# Patient Record
Sex: MALE | Race: OTHER | HISPANIC OR LATINO | URBAN - METROPOLITAN AREA
[De-identification: names, ages, dates, MRNs, and addresses within clinical notes are randomized per-mention and may not be internally consistent; named-entity substitution may affect disease eponyms.]

---

## 2020-01-01 ENCOUNTER — OUTPATIENT (OUTPATIENT)
Dept: OUTPATIENT SERVICES | Facility: HOSPITAL | Age: 75
LOS: 1 days | End: 2020-01-01

## 2020-01-01 ENCOUNTER — INPATIENT (INPATIENT)
Facility: HOSPITAL | Age: 75
LOS: 0 days | DRG: 177 | End: 2020-04-19
Attending: INTERNAL MEDICINE | Admitting: INTERNAL MEDICINE
Payer: MEDICARE

## 2020-01-01 VITALS
SYSTOLIC BLOOD PRESSURE: 90 MMHG | RESPIRATION RATE: 19 BRPM | OXYGEN SATURATION: 59 % | TEMPERATURE: 100 F | DIASTOLIC BLOOD PRESSURE: 54 MMHG | HEART RATE: 82 BPM

## 2020-01-01 VITALS
OXYGEN SATURATION: 89 % | DIASTOLIC BLOOD PRESSURE: 62 MMHG | TEMPERATURE: 98 F | RESPIRATION RATE: 32 BRPM | SYSTOLIC BLOOD PRESSURE: 104 MMHG | HEART RATE: 102 BPM

## 2020-01-01 DIAGNOSIS — J96.01 ACUTE RESPIRATORY FAILURE WITH HYPOXIA: ICD-10-CM

## 2020-01-01 DIAGNOSIS — I48.91 UNSPECIFIED ATRIAL FIBRILLATION: ICD-10-CM

## 2020-01-01 DIAGNOSIS — Z29.9 ENCOUNTER FOR PROPHYLACTIC MEASURES, UNSPECIFIED: ICD-10-CM

## 2020-01-01 DIAGNOSIS — I10 ESSENTIAL (PRIMARY) HYPERTENSION: ICD-10-CM

## 2020-01-01 DIAGNOSIS — N17.9 ACUTE KIDNEY FAILURE, UNSPECIFIED: ICD-10-CM

## 2020-01-01 DIAGNOSIS — U07.1 COVID-19: ICD-10-CM

## 2020-01-01 LAB
A1C WITH ESTIMATED AVERAGE GLUCOSE RESULT: 5.5 % — SIGNIFICANT CHANGE UP (ref 4–5.6)
ALBUMIN SERPL ELPH-MCNC: 2.5 G/DL — LOW (ref 3.5–5)
ALBUMIN SERPL ELPH-MCNC: 2.6 G/DL — LOW (ref 3.5–5)
ALP SERPL-CCNC: 100 U/L — SIGNIFICANT CHANGE UP (ref 40–120)
ALP SERPL-CCNC: 87 U/L — SIGNIFICANT CHANGE UP (ref 40–120)
ALT FLD-CCNC: 20 U/L DA — SIGNIFICANT CHANGE UP (ref 10–60)
ALT FLD-CCNC: 21 U/L DA — SIGNIFICANT CHANGE UP (ref 10–60)
ANION GAP SERPL CALC-SCNC: 8 MMOL/L — SIGNIFICANT CHANGE UP (ref 5–17)
ANION GAP SERPL CALC-SCNC: 8 MMOL/L — SIGNIFICANT CHANGE UP (ref 5–17)
APPEARANCE UR: CLEAR — SIGNIFICANT CHANGE UP
APTT BLD: 62.5 SEC — HIGH (ref 27.5–36.3)
AST SERPL-CCNC: 25 U/L — SIGNIFICANT CHANGE UP (ref 10–40)
AST SERPL-CCNC: 42 U/L — HIGH (ref 10–40)
BASOPHILS # BLD AUTO: 0.03 K/UL — SIGNIFICANT CHANGE UP (ref 0–0.2)
BASOPHILS # BLD AUTO: 0.03 K/UL — SIGNIFICANT CHANGE UP (ref 0–0.2)
BASOPHILS NFR BLD AUTO: 0.2 % — SIGNIFICANT CHANGE UP (ref 0–2)
BASOPHILS NFR BLD AUTO: 0.2 % — SIGNIFICANT CHANGE UP (ref 0–2)
BILIRUB SERPL-MCNC: 0.8 MG/DL — SIGNIFICANT CHANGE UP (ref 0.2–1.2)
BILIRUB SERPL-MCNC: 0.9 MG/DL — SIGNIFICANT CHANGE UP (ref 0.2–1.2)
BILIRUB UR-MCNC: NEGATIVE — SIGNIFICANT CHANGE UP
BUN SERPL-MCNC: 36 MG/DL — HIGH (ref 7–18)
BUN SERPL-MCNC: 46 MG/DL — HIGH (ref 7–18)
CALCIUM SERPL-MCNC: 8.4 MG/DL — SIGNIFICANT CHANGE UP (ref 8.4–10.5)
CALCIUM SERPL-MCNC: 8.8 MG/DL — SIGNIFICANT CHANGE UP (ref 8.4–10.5)
CHLORIDE SERPL-SCNC: 109 MMOL/L — HIGH (ref 96–108)
CHLORIDE SERPL-SCNC: 112 MMOL/L — HIGH (ref 96–108)
CHOLEST SERPL-MCNC: 125 MG/DL — SIGNIFICANT CHANGE UP (ref 10–199)
CK SERPL-CCNC: 186 U/L — SIGNIFICANT CHANGE UP (ref 35–232)
CO2 SERPL-SCNC: 23 MMOL/L — SIGNIFICANT CHANGE UP (ref 22–31)
CO2 SERPL-SCNC: 24 MMOL/L — SIGNIFICANT CHANGE UP (ref 22–31)
COLOR SPEC: YELLOW — SIGNIFICANT CHANGE UP
CREAT ?TM UR-MCNC: 228 MG/DL — SIGNIFICANT CHANGE UP
CREAT SERPL-MCNC: 1.39 MG/DL — HIGH (ref 0.5–1.3)
CREAT SERPL-MCNC: 2.07 MG/DL — HIGH (ref 0.5–1.3)
CRP SERPL-MCNC: 16.41 MG/DL — HIGH (ref 0–0.4)
D DIMER BLD IA.RAPID-MCNC: 7442 NG/ML DDU — HIGH
DIFF PNL FLD: ABNORMAL
EOSINOPHIL # BLD AUTO: 0 K/UL — SIGNIFICANT CHANGE UP (ref 0–0.5)
EOSINOPHIL # BLD AUTO: 0.02 K/UL — SIGNIFICANT CHANGE UP (ref 0–0.5)
EOSINOPHIL NFR BLD AUTO: 0 % — SIGNIFICANT CHANGE UP (ref 0–6)
EOSINOPHIL NFR BLD AUTO: 0.1 % — SIGNIFICANT CHANGE UP (ref 0–6)
ESTIMATED AVERAGE GLUCOSE: 111 MG/DL — SIGNIFICANT CHANGE UP (ref 68–114)
FERRITIN SERPL-MCNC: 822 NG/ML — HIGH (ref 30–400)
FIBRINOGEN PPP-MCNC: 882 MG/DL — HIGH (ref 350–510)
GLUCOSE SERPL-MCNC: 163 MG/DL — HIGH (ref 70–99)
GLUCOSE SERPL-MCNC: 208 MG/DL — HIGH (ref 70–99)
GLUCOSE UR QL: NEGATIVE — SIGNIFICANT CHANGE UP
HCT VFR BLD CALC: 29.8 % — LOW (ref 39–50)
HCT VFR BLD CALC: 32.1 % — LOW (ref 39–50)
HDLC SERPL-MCNC: 33 MG/DL — LOW
HGB BLD-MCNC: 10.5 G/DL — LOW (ref 13–17)
HGB BLD-MCNC: 9.8 G/DL — LOW (ref 13–17)
IMM GRANULOCYTES NFR BLD AUTO: 0.8 % — SIGNIFICANT CHANGE UP (ref 0–1.5)
IMM GRANULOCYTES NFR BLD AUTO: 1 % — SIGNIFICANT CHANGE UP (ref 0–1.5)
INR BLD: 2.21 RATIO — HIGH (ref 0.88–1.16)
KETONES UR-MCNC: NEGATIVE — SIGNIFICANT CHANGE UP
LACTATE SERPL-SCNC: 2.1 MMOL/L — HIGH (ref 0.7–2)
LDH SERPL L TO P-CCNC: 549 U/L — HIGH (ref 120–225)
LEUKOCYTE ESTERASE UR-ACNC: NEGATIVE — SIGNIFICANT CHANGE UP
LIPID PNL WITH DIRECT LDL SERPL: 63 MG/DL — SIGNIFICANT CHANGE UP
LYMPHOCYTES # BLD AUTO: 0.71 K/UL — LOW (ref 1–3.3)
LYMPHOCYTES # BLD AUTO: 0.73 K/UL — LOW (ref 1–3.3)
LYMPHOCYTES # BLD AUTO: 4.3 % — LOW (ref 13–44)
LYMPHOCYTES # BLD AUTO: 5.4 % — LOW (ref 13–44)
MAGNESIUM SERPL-MCNC: 2.4 MG/DL — SIGNIFICANT CHANGE UP (ref 1.6–2.6)
MCHC RBC-ENTMCNC: 28.3 PG — SIGNIFICANT CHANGE UP (ref 27–34)
MCHC RBC-ENTMCNC: 28.9 PG — SIGNIFICANT CHANGE UP (ref 27–34)
MCHC RBC-ENTMCNC: 32.7 GM/DL — SIGNIFICANT CHANGE UP (ref 32–36)
MCHC RBC-ENTMCNC: 32.9 GM/DL — SIGNIFICANT CHANGE UP (ref 32–36)
MCV RBC AUTO: 86.1 FL — SIGNIFICANT CHANGE UP (ref 80–100)
MCV RBC AUTO: 88.4 FL — SIGNIFICANT CHANGE UP (ref 80–100)
MONOCYTES # BLD AUTO: 0.55 K/UL — SIGNIFICANT CHANGE UP (ref 0–0.9)
MONOCYTES # BLD AUTO: 0.99 K/UL — HIGH (ref 0–0.9)
MONOCYTES NFR BLD AUTO: 3.3 % — SIGNIFICANT CHANGE UP (ref 2–14)
MONOCYTES NFR BLD AUTO: 7.3 % — SIGNIFICANT CHANGE UP (ref 2–14)
NEUTROPHILS # BLD AUTO: 11.66 K/UL — HIGH (ref 1.8–7.4)
NEUTROPHILS # BLD AUTO: 15.07 K/UL — HIGH (ref 1.8–7.4)
NEUTROPHILS NFR BLD AUTO: 86.2 % — HIGH (ref 43–77)
NEUTROPHILS NFR BLD AUTO: 91.2 % — HIGH (ref 43–77)
NITRITE UR-MCNC: NEGATIVE — SIGNIFICANT CHANGE UP
NRBC # BLD: 0 /100 WBCS — SIGNIFICANT CHANGE UP (ref 0–0)
NRBC # BLD: 0 /100 WBCS — SIGNIFICANT CHANGE UP (ref 0–0)
NT-PROBNP SERPL-SCNC: 5524 PG/ML — HIGH (ref 0–450)
PH UR: 5 — SIGNIFICANT CHANGE UP (ref 5–8)
PHOSPHATE SERPL-MCNC: 2.4 MG/DL — LOW (ref 2.5–4.5)
PLATELET # BLD AUTO: 372 K/UL — SIGNIFICANT CHANGE UP (ref 150–400)
PLATELET # BLD AUTO: 439 K/UL — HIGH (ref 150–400)
POTASSIUM SERPL-MCNC: 3.9 MMOL/L — SIGNIFICANT CHANGE UP (ref 3.5–5.3)
POTASSIUM SERPL-MCNC: 4.2 MMOL/L — SIGNIFICANT CHANGE UP (ref 3.5–5.3)
POTASSIUM SERPL-SCNC: 3.9 MMOL/L — SIGNIFICANT CHANGE UP (ref 3.5–5.3)
POTASSIUM SERPL-SCNC: 4.2 MMOL/L — SIGNIFICANT CHANGE UP (ref 3.5–5.3)
PROT SERPL-MCNC: 6.8 G/DL — SIGNIFICANT CHANGE UP (ref 6–8.3)
PROT SERPL-MCNC: 7.1 G/DL — SIGNIFICANT CHANGE UP (ref 6–8.3)
PROT UR-MCNC: 30 MG/DL
PROTHROM AB SERPL-ACNC: 25.6 SEC — HIGH (ref 10–12.9)
RBC # BLD: 3.46 M/UL — LOW (ref 4.2–5.8)
RBC # BLD: 3.63 M/UL — LOW (ref 4.2–5.8)
RBC # FLD: 12 % — SIGNIFICANT CHANGE UP (ref 10.3–14.5)
RBC # FLD: 12.2 % — SIGNIFICANT CHANGE UP (ref 10.3–14.5)
SARS-COV-2 RNA SPEC QL NAA+PROBE: DETECTED
SODIUM SERPL-SCNC: 140 MMOL/L — SIGNIFICANT CHANGE UP (ref 135–145)
SODIUM SERPL-SCNC: 144 MMOL/L — SIGNIFICANT CHANGE UP (ref 135–145)
SODIUM UR-SCNC: 19 MMOL/L — SIGNIFICANT CHANGE UP
SP GR SPEC: 1.02 — SIGNIFICANT CHANGE UP (ref 1.01–1.02)
TOTAL CHOLESTEROL/HDL RATIO MEASUREMENT: 3.8 RATIO — SIGNIFICANT CHANGE UP (ref 3.4–9.6)
TRIGL SERPL-MCNC: 144 MG/DL — SIGNIFICANT CHANGE UP (ref 10–149)
TROPONIN I SERPL-MCNC: 0.02 NG/ML — SIGNIFICANT CHANGE UP (ref 0–0.04)
TSH SERPL-MCNC: 0.4 UU/ML — SIGNIFICANT CHANGE UP (ref 0.34–4.82)
UROBILINOGEN FLD QL: NEGATIVE — SIGNIFICANT CHANGE UP
VIT B12 SERPL-MCNC: 360 PG/ML — SIGNIFICANT CHANGE UP (ref 232–1245)
WBC # BLD: 13.54 K/UL — HIGH (ref 3.8–10.5)
WBC # BLD: 16.53 K/UL — HIGH (ref 3.8–10.5)
WBC # FLD AUTO: 13.54 K/UL — HIGH (ref 3.8–10.5)
WBC # FLD AUTO: 16.53 K/UL — HIGH (ref 3.8–10.5)

## 2020-01-01 PROCEDURE — 85027 COMPLETE CBC AUTOMATED: CPT

## 2020-01-01 PROCEDURE — 82607 VITAMIN B-12: CPT

## 2020-01-01 PROCEDURE — 93005 ELECTROCARDIOGRAM TRACING: CPT

## 2020-01-01 PROCEDURE — 84300 ASSAY OF URINE SODIUM: CPT

## 2020-01-01 PROCEDURE — 87635 SARS-COV-2 COVID-19 AMP PRB: CPT

## 2020-01-01 PROCEDURE — 85610 PROTHROMBIN TIME: CPT

## 2020-01-01 PROCEDURE — 71045 X-RAY EXAM CHEST 1 VIEW: CPT | Mod: 26

## 2020-01-01 PROCEDURE — 83880 ASSAY OF NATRIURETIC PEPTIDE: CPT

## 2020-01-01 PROCEDURE — 81001 URINALYSIS AUTO W/SCOPE: CPT

## 2020-01-01 PROCEDURE — 84100 ASSAY OF PHOSPHORUS: CPT

## 2020-01-01 PROCEDURE — 82570 ASSAY OF URINE CREATININE: CPT

## 2020-01-01 PROCEDURE — 82550 ASSAY OF CK (CPK): CPT

## 2020-01-01 PROCEDURE — 84443 ASSAY THYROID STIM HORMONE: CPT

## 2020-01-01 PROCEDURE — 85384 FIBRINOGEN ACTIVITY: CPT

## 2020-01-01 PROCEDURE — 99291 CRITICAL CARE FIRST HOUR: CPT | Mod: 25

## 2020-01-01 PROCEDURE — 83735 ASSAY OF MAGNESIUM: CPT

## 2020-01-01 PROCEDURE — 83605 ASSAY OF LACTIC ACID: CPT

## 2020-01-01 PROCEDURE — 36415 COLL VENOUS BLD VENIPUNCTURE: CPT

## 2020-01-01 PROCEDURE — 80061 LIPID PANEL: CPT

## 2020-01-01 PROCEDURE — 83036 HEMOGLOBIN GLYCOSYLATED A1C: CPT

## 2020-01-01 PROCEDURE — 82962 GLUCOSE BLOOD TEST: CPT

## 2020-01-01 PROCEDURE — 85730 THROMBOPLASTIN TIME PARTIAL: CPT

## 2020-01-01 PROCEDURE — 71045 X-RAY EXAM CHEST 1 VIEW: CPT

## 2020-01-01 PROCEDURE — 86140 C-REACTIVE PROTEIN: CPT

## 2020-01-01 PROCEDURE — 82728 ASSAY OF FERRITIN: CPT

## 2020-01-01 PROCEDURE — 99291 CRITICAL CARE FIRST HOUR: CPT

## 2020-01-01 PROCEDURE — 80053 COMPREHEN METABOLIC PANEL: CPT

## 2020-01-01 PROCEDURE — 85379 FIBRIN DEGRADATION QUANT: CPT

## 2020-01-01 PROCEDURE — 83615 LACTATE (LD) (LDH) ENZYME: CPT

## 2020-01-01 PROCEDURE — 84484 ASSAY OF TROPONIN QUANT: CPT

## 2020-01-01 RX ORDER — SODIUM CHLORIDE 9 MG/ML
1000 INJECTION INTRAMUSCULAR; INTRAVENOUS; SUBCUTANEOUS
Refills: 0 | Status: DISCONTINUED | OUTPATIENT
Start: 2020-01-01 | End: 2020-01-01

## 2020-01-01 RX ORDER — LABETALOL HCL 100 MG
300 TABLET ORAL
Refills: 0 | Status: DISCONTINUED | OUTPATIENT
Start: 2020-01-01 | End: 2020-01-01

## 2020-01-01 RX ORDER — NIFEDIPINE 30 MG
90 TABLET, EXTENDED RELEASE 24 HR ORAL DAILY
Refills: 0 | Status: DISCONTINUED | OUTPATIENT
Start: 2020-01-01 | End: 2020-01-01

## 2020-01-01 RX ORDER — TOCILIZUMAB 20 MG/ML
400 INJECTION, SOLUTION, CONCENTRATE INTRAVENOUS ONCE
Refills: 0 | Status: COMPLETED | OUTPATIENT
Start: 2020-01-01 | End: 2020-01-01

## 2020-01-01 RX ORDER — RIVAROXABAN 15 MG-20MG
15 KIT ORAL
Refills: 0 | Status: DISCONTINUED | OUTPATIENT
Start: 2020-01-01 | End: 2020-01-01

## 2020-01-01 RX ORDER — MORPHINE SULFATE 50 MG/1
1 CAPSULE, EXTENDED RELEASE ORAL DAILY
Refills: 0 | Status: DISCONTINUED | OUTPATIENT
Start: 2020-01-01 | End: 2020-01-01

## 2020-01-01 RX ORDER — METOPROLOL TARTRATE 50 MG
2.5 TABLET ORAL ONCE
Refills: 0 | Status: COMPLETED | OUTPATIENT
Start: 2020-01-01 | End: 2020-01-01

## 2020-01-01 RX ORDER — SODIUM CHLORIDE 9 MG/ML
1000 INJECTION, SOLUTION INTRAVENOUS
Refills: 0 | Status: DISCONTINUED | OUTPATIENT
Start: 2020-01-01 | End: 2020-01-01

## 2020-01-01 RX ORDER — PANTOPRAZOLE SODIUM 20 MG/1
40 TABLET, DELAYED RELEASE ORAL DAILY
Refills: 0 | Status: DISCONTINUED | OUTPATIENT
Start: 2020-01-01 | End: 2020-01-01

## 2020-01-01 RX ORDER — ATORVASTATIN CALCIUM 80 MG/1
40 TABLET, FILM COATED ORAL AT BEDTIME
Refills: 0 | Status: DISCONTINUED | OUTPATIENT
Start: 2020-01-01 | End: 2020-01-01

## 2020-01-01 RX ADMIN — TOCILIZUMAB 100 MILLIGRAM(S): 20 INJECTION, SOLUTION, CONCENTRATE INTRAVENOUS at 22:22

## 2020-01-01 RX ADMIN — Medication 2.5 MILLIGRAM(S): at 00:50

## 2020-01-01 RX ADMIN — Medication 0.25 MILLIGRAM(S): at 00:51

## 2020-01-01 RX ADMIN — SODIUM CHLORIDE 75 MILLILITER(S): 9 INJECTION INTRAMUSCULAR; INTRAVENOUS; SUBCUTANEOUS at 17:26

## 2020-01-01 RX ADMIN — Medication 90 MILLIGRAM(S): at 06:18

## 2020-01-01 RX ADMIN — Medication 40 MILLIGRAM(S): at 17:25

## 2020-01-01 RX ADMIN — PANTOPRAZOLE SODIUM 40 MILLIGRAM(S): 20 TABLET, DELAYED RELEASE ORAL at 13:10

## 2020-01-01 RX ADMIN — RIVAROXABAN 15 MILLIGRAM(S): KIT at 17:26

## 2020-01-01 RX ADMIN — ATORVASTATIN CALCIUM 40 MILLIGRAM(S): 80 TABLET, FILM COATED ORAL at 22:22

## 2020-01-01 RX ADMIN — Medication 300 MILLIGRAM(S): at 06:18

## 2020-01-01 RX ADMIN — Medication 40 MILLIGRAM(S): at 06:18

## 2020-01-01 RX ADMIN — SODIUM CHLORIDE 75 MILLILITER(S): 9 INJECTION, SOLUTION INTRAVENOUS at 00:50

## 2020-04-18 NOTE — ED PROVIDER NOTE - OBJECTIVE STATEMENT
75 male cad / ppm / sp ablation / xarelto / L shoulder pain for a few days. pt poor historian and sob - uto hx from him.

## 2020-04-18 NOTE — ED ADULT TRIAGE NOTE - CHIEF COMPLAINT QUOTE
asn per ems report pt has been running fever for the few days and became short of breath for the past 2-3 hours , pt is on z pack since 04/14

## 2020-04-18 NOTE — H&P ADULT - ASSESSMENT
75M from home, lives with wife and kids, PMH of Emilie on xarelto, s/p pacemaker, HTN presented to ED c/o shortness of breath. He states he has been having body aches, loss of appetite, mild dry cough for 1 week and yesterday night, he couldn't sleep due to shortness of breath. Denies sick contacts, vomiting, diarrhea, chest pain, palpitations, travel.    Admitted for pneumonia, related to COVID-19.   GOC: FULL CODE

## 2020-04-18 NOTE — H&P ADULT - HISTORY OF PRESENT ILLNESS
75M from home, lives with wife and kids, PMH of Emilie on xarelto, s/p pacemaker, HTN presented to ED c/o shortness of breath. He states he has been having body aches, loss of appetite, mild dry cough for 1 week and yesterday night, he couldn't sleep due to shortness of breath. Denies sick contacts, vomiting, diarrhea, chest pain, palpitations, travel.

## 2020-04-18 NOTE — ED PROVIDER NOTE - CRITICAL CARE PROVIDED
interpretation of diagnostic studies/direct patient care (not related to procedure)/documentation/additional history taking/consult w/ pt's family directly relating to pts condition

## 2020-04-18 NOTE — ED PROVIDER NOTE - PROGRESS NOTE DETAILS
dw pt daughter 2 wks ago had bran, on abx / then worse  / has been sleeping / poor po / productive cough / on xarelto / hallucinating and sob yest. full code. o2 was 86 on nrb, now high 90s when prone. xr read by me. R lung > L lung opacification. may be related to pt positioning (pt semi prone on R). discussed the case with the admitting MD who accepts the patient for admission. will not call icu as pt currently stable o2 but high risk for worsening over next few days.

## 2020-04-18 NOTE — ED ADULT NURSE NOTE - ED STAT RN HANDOFF DETAILS
Patient admitted to medicine in no acute distress placed in prone position to facilitate oxygenation. Patient endorsed to hold RN Marcia to be transported with oxygen stable in no acute distress safety maintained.

## 2020-04-18 NOTE — ED ADULT NURSE NOTE - OBJECTIVE STATEMENT
Patient BIBA with c/o SOB, fatigue , poor appetite, fever, and generalized body aches as per family . Patient was seen in Urgent Care 2 weeks ago provided antibiotics and went home continues to feel sick went back to Urgent Care with wife on monday to be tested for Covid awaiting results monday. Patient presented with oxygensaturation Patient BIBA with c/o SOB, fatigue , poor appetite, fever, and generalized body aches as per family . Patient was seen in Urgent Care 2 weeks ago provided antibiotics and went home continues to feel sick went back to Urgent Care with wife on monday to be tested for Covid awaiting results monday. Patient presented with oxygen saturation in 80"s.

## 2020-04-18 NOTE — ED PROVIDER NOTE - CARE PLAN
Principal Discharge DX:	Acute respiratory failure with hypoxia  Secondary Diagnosis:	Suspected COVID-19 virus infection Principal Discharge DX:	Acute respiratory failure with hypoxia  Goal:	multiple organ system dysfunction  Secondary Diagnosis:	Suspected COVID-19 virus infection

## 2020-04-18 NOTE — H&P ADULT - NSHPPHYSICALEXAM_GEN_ALL_CORE
Vital Signs Last 24 Hrs  T(C): 36.2 (18 Apr 2020 11:23), Max: 37.5 (18 Apr 2020 06:59)  T(F): 97.2 (18 Apr 2020 11:23), Max: 99.5 (18 Apr 2020 06:59)  HR: 95 (18 Apr 2020 11:23) (75 - 95)  BP: 105/62 (18 Apr 2020 11:23) (90/54 - 105/62)  BP(mean): --  RR: 18 (18 Apr 2020 11:23) (18 - 21)  SpO2: 95% (18 Apr 2020 11:23) (59% - 100%)

## 2020-04-18 NOTE — ED ADULT NURSE NOTE - NSIMPLEMENTINTERV_GEN_ALL_ED
Implemented All Fall with Harm Risk Interventions:  [place] to call system. Call bell, personal items and telephone within reach. Instruct patient to call for assistance. Room bathroom lighting operational. Non-slip footwear when patient is off stretcher. Physically safe environment: no spills, clutter or unnecessary equipment. Stretcher in lowest position, wheels locked, appropriate side rails in place. Provide visual cue, wrist band, yellow gown, etc. Monitor gait and stability. Monitor for mental status changes and reorient to person, place, and time. Review medications for side effects contributing to fall risk. Reinforce activity limits and safety measures with patient and family. Provide visual clues: red socks.

## 2020-04-18 NOTE — H&P ADULT - PROBLEM SELECTOR PLAN 1
p/w Shortness of breath, cough   - Respiratory status- Not in distress, S02 on NC on my evaluation  - T-   - WBC:13, lymphopenia 0.73, transaminitis  - CXR - B/L infiltrates  - Ed course; Rocephin and Azithro, IV NS lt.   - Will hold off antibiotics for now  - Tylenol, Robitussin PRN  - plaquenil  - Will rule out covid 19 with contact and airborne isolation precaution   - CRP 16, ferritin 822, d dimer 7442  - started on toci with steroids p/w Shortness of breath, cough   - Respiratory status- Not in distress, S02 93% on NRB on my evaluation  - WBC:13, lymphopenia 0.73, transaminitis  - CXR - B/L infiltrates  - Ed course; Rocephin and Azithro, IV NS lt.   - Will hold off antibiotics for now  - Tylenol, Robitussin PRN  - plaquenil  - Will rule out covid 19 with contact and airborne isolation precaution   - CRP 16, ferritin 822, d dimer 7442  - started on toci with steroids

## 2020-04-18 NOTE — H&P ADULT - PROBLEM SELECTOR PLAN 5
IMPROVE VTE Individual Risk Assessment  RISK                                                                Points  [  ] Previous VTE                                                  3  [  ] Thrombophilia                                               2  [  ] Lower limb paralysis                                      2        (unable to hold up >15 seconds)    [  ] Current Cancer                                              2         (within 6 months)  [x  ] Immobilization > 24 hrs                                1  [  ] ICU/CCU stay > 24 hours                              1  [x  ] Age > 60                                                      1  IMPROVE VTE Score _________2, xarelto for DVT proph

## 2020-04-19 NOTE — CHART NOTE - NSCHARTNOTEFT_GEN_A_CORE
Spoke to daughter Chrissy [phone] and informed her of her father's death. Advised her to have the  home get in contact with the hospital for arrangements.

## 2020-04-19 NOTE — CHART NOTE - NSCHARTNOTEFT_GEN_A_CORE
EVENT: Pt continue to desat on nonrebreather     OBJECTIVE:  Vital Signs Last 24 Hrs  T(C): 37.2 (19 Apr 2020 00:27), Max: 37.5 (18 Apr 2020 06:59)  T(F): 98.9 (19 Apr 2020 00:27), Max: 99.5 (18 Apr 2020 06:59)  HR: 124 (19 Apr 2020 05:18) (75 - 124)  BP: 160/89 (19 Apr 2020 05:18) (90/54 - 160/89)  BP(mean): --  RR: 24 (19 Apr 2020 00:27) (18 - 26)  SpO2: 86% (19 Apr 2020 06:00) (57% - 100%)    FOCUSED PHYSICAL EXAM:  Neuro: awake, alert, oriented x 3. No neuro deficit  Cardiovascular: Pulses +2 B/L in lower and upper extremities, HR regular, BP stable, No edema.  Respiratory: Respirations regular, unlabored, breath sounds clear B/L.   GI: Abdomen soft, non-tender, positive bowel sounds.  : no bladder distention noted. No complaints at this time.  Skin: Dry, intact, no bruising, no diaphoresis.    I&O's      LABS:                        10.5   16.53 )-----------( 439      ( 19 Apr 2020 06:01 )             32.1   CARDIAC MARKERS ( 18 Apr 2020 08:16 )  0.018 ng/mL / x     / 186 U/L / x     / x        04-19    144  |  112<H>  |  36<H>  ----------------------------<  208<H>  4.2   |  24  |  1.39<H>    Ca    8.8      19 Apr 2020 06:01  Phos  2.4     04-19  Mg     2.4     04-19    TPro  7.1  /  Alb  2.5<L>  /  TBili  0.8  /  DBili  x   /  AST  42<H>  /  ALT  21  /  AlkPhos  100  04-19    ASSESSMENT:  75M from home, lives with wife and kids, PMH of Emilie on xarelto, s/p pacemaker, HTN presented to ED c/o shortness of breath. Admitted for hypoxic respiratory failure secondary to covid pneumonia. S/P RRT earlier this evening for respiratory distress. Pt DNR/DNI.     PLAN:   -Saturating 65% with non rebreather  -Placed in prone position and added 8L nasal cannula with improvement in 02 sat to 86%  -Pt DNR/DNI - revisited goals of care again with pt, discussed intubation, pt expressed DNR/DNI wishes again   -Grim prognosis, encouraged to call family in AM     FOLLOW UP / RESULT:

## 2020-04-19 NOTE — RAPID RESPONSE TEAM SUMMARY - NSSITUATIONBACKGROUNDRRT_GEN_ALL_CORE
75M from home, lives with wife and kids, PMH of Emilie on Xarelto, s/p pacemaker, HTN presented to ED c/o shortness of breath. He states he has been having body aches, loss of appetite, mild dry cough for 1 week and yesterday night, admitted for COVID-19 associated pneumonia with hypoxia requiring NRB. RRT called due to hypoxia to 87-88% on 100% NRB. Patient noted to be in mild respiratory distress, however AOx4 and currently does not want further invasive interventions to be done, would like to be DNR/DNI. Pt would like us to convey this to the family as well.     [place]i translation aided by Dr. Joselito Haji at bedside.

## 2020-04-19 NOTE — CHART NOTE - NSCHARTNOTEFT_GEN_A_CORE
EVENT: Pt desaturating to 65% on nonrebreather     OBJECTIVE:  Vital Signs Last 24 Hrs  T(C): 37.2 (19 Apr 2020 00:27), Max: 37.5 (18 Apr 2020 06:59)  T(F): 98.9 (19 Apr 2020 00:27), Max: 99.5 (18 Apr 2020 06:59)  HR: 93 (19 Apr 2020 00:27) (75 - 100)  BP: 120/96 (19 Apr 2020 00:27) (90/54 - 139/64)  BP(mean): --  RR: 24 (19 Apr 2020 00:27) (18 - 26)  SpO2: 87% (19 Apr 2020 00:27) (59% - 100%)    FOCUSED PHYSICAL EXAM:  Neuro: awake, alert, oriented x 3. No neuro deficit  Cardiovascular: Pulses +2 B/L in lower and upper extremities, HR regular, BP stable, No edema.  Respiratory: tachypneic   GI: Abdomen soft, non-tender, positive bowel sounds.  : no bladder distention noted. No complaints at this time.  Skin: Dry, intact, no bruising, no diaphoresis.    I&O's    LABS:                        9.8    13.54 )-----------( 372      ( 18 Apr 2020 08:16 )             29.8   CARDIAC MARKERS ( 18 Apr 2020 08:16 )  0.018 ng/mL / x     / 186 U/L / x     / x        04-18    140  |  109<H>  |  46<H>  ----------------------------<  163<H>  3.9   |  23  |  2.07<H>    Ca    8.4      18 Apr 2020 08:16    TPro  6.8  /  Alb  2.6<L>  /  TBili  0.9  /  DBili  x   /  AST  25  /  ALT  20  /  AlkPhos  87  04-18      ASSESSMENT:  75M from home, lives with wife and kids, PMH of Emilie on xarelto, s/p pacemaker, HTN presented to ED c/o shortness of breath. Admitted for hypoxic respiratory failure secondary to covid pneumonia. S/P RRT earlier this evening for respiratory distress. Pt DNR/DNI.     PLAN:   -Saturating 65% on NRB with dyspnea at rest   -Added 8L nasal cannula 02 in addition to NRB   -Pt proned     FOLLOW UP / RESULT:   -02 saturations with gradual improvement to 91%   -Discussed with nursing checks to monitor frequently while pt is in prone position   -Will titrate nasal cannula down as tolerated   -Palliative care consult in AM EVENT: Pt desaturating to 65% on nonrebreather     OBJECTIVE:  Vital Signs Last 24 Hrs  T(C): 37.2 (19 Apr 2020 00:27), Max: 37.5 (18 Apr 2020 06:59)  T(F): 98.9 (19 Apr 2020 00:27), Max: 99.5 (18 Apr 2020 06:59)  HR: 93 (19 Apr 2020 00:27) (75 - 100)  BP: 120/96 (19 Apr 2020 00:27) (90/54 - 139/64)  BP(mean): --  RR: 24 (19 Apr 2020 00:27) (18 - 26)  SpO2: 87% (19 Apr 2020 00:27) (59% - 100%)    FOCUSED PHYSICAL EXAM:  Neuro: awake, alert, oriented x 3. No neuro deficit  Cardiovascular: Pulses +2 B/L in lower and upper extremities, HR regular, BP stable, No edema.  Respiratory: tachypneic   GI: Abdomen soft, non-tender, positive bowel sounds.  : no bladder distention noted. No complaints at this time.  Skin: Dry, intact, no bruising, no diaphoresis.    I&O's    LABS:                        9.8    13.54 )-----------( 372      ( 18 Apr 2020 08:16 )             29.8   CARDIAC MARKERS ( 18 Apr 2020 08:16 )  0.018 ng/mL / x     / 186 U/L / x     / x        04-18    140  |  109<H>  |  46<H>  ----------------------------<  163<H>  3.9   |  23  |  2.07<H>    Ca    8.4      18 Apr 2020 08:16    TPro  6.8  /  Alb  2.6<L>  /  TBili  0.9  /  DBili  x   /  AST  25  /  ALT  20  /  AlkPhos  87  04-18      ASSESSMENT:  75M from home, lives with wife and kids, PMH of Emilie on xarelto, s/p pacemaker, HTN presented to ED c/o shortness of breath. Admitted for hypoxic respiratory failure secondary to covid pneumonia. S/P RRT earlier this evening for respiratory distress. Pt DNR/DNI.     PLAN:   -Saturating 65% on NRB with dyspnea at rest   -Added 8L nasal cannula 02 in addition to NRB   -Pt proned   -S/p one dose of 0.25mg ativan, can restart home dose of Clonazepam when 02 saturation improves     FOLLOW UP / RESULT:   -02 saturations with gradual improvement to 91%   -Discussed with nursing checks to monitor frequently while pt is in prone position   -Will titrate nasal cannula down as tolerated   -Palliative care consult in AM

## 2020-04-19 NOTE — RAPID RESPONSE TEAM SUMMARY - NSMEDICATIONSRRT_GEN_ALL_CORE
IV metoprolol 2.5mg x1 for tachycardia. IV metoprolol 2.5mg x1 for tachycardia.  IV Ativan 0.25mg x1 push also ordered as patient stated he felt a bit anxious

## 2020-04-19 NOTE — CHART NOTE - NSCHARTNOTEFT_GEN_A_CORE
Attempted to speak to pt with phone . Failed as  could not hear over the NRBM. Environmental services came to change the trash and interpreted in Bangladeshi. Pt states repeatedly he has pain in his chest. Advised pt he needs to contact his family and pt understood and has personal cell phone to contact family. Will order morphine IV for pt once he is finished speaking with his family.

## 2020-04-19 NOTE — DISCHARGE NOTE FOR THE EXPIRED PATIENT - HOSPITAL COURSE
75M with PMH of Emilie on xarelto, s/p pacemaker, HTN presented to ED c/o shortness of breath. He had been having body aches, loss of appetite, mild dry cough for 1 week and 2nights ago, he couldn't sleep due to shortness of breath. Denied sick contacts, vomiting, diarrhea, chest pain, palpitations, travel. He was saturating poorly on a NRB and admitted to chest pain. Pt was informed he was declining and to speak to his family. He was given pain medication and  later in the day.

## 2020-04-21 RX ORDER — ATORVASTATIN CALCIUM 80 MG/1
1 TABLET, FILM COATED ORAL
Qty: 0 | Refills: 0 | DISCHARGE

## 2020-04-21 RX ORDER — FONDAPARINUX SODIUM 2.5 MG/.5ML
1 INJECTION, SOLUTION SUBCUTANEOUS
Qty: 0 | Refills: 0 | DISCHARGE

## 2020-04-21 RX ORDER — AZITHROMYCIN 500 MG/1
0 TABLET, FILM COATED ORAL
Qty: 0 | Refills: 0 | DISCHARGE

## 2020-04-21 RX ORDER — CLONAZEPAM 1 MG
1 TABLET ORAL
Qty: 0 | Refills: 0 | DISCHARGE

## 2020-04-21 RX ORDER — NIFEDIPINE 30 MG
1 TABLET, EXTENDED RELEASE 24 HR ORAL
Qty: 0 | Refills: 0 | DISCHARGE

## 2020-04-21 RX ORDER — LABETALOL HCL 100 MG
1 TABLET ORAL
Qty: 0 | Refills: 0 | DISCHARGE

## 2020-04-21 RX ORDER — LABETALOL HCL 100 MG
0 TABLET ORAL
Qty: 0 | Refills: 0 | DISCHARGE

## 2020-04-22 LAB — GLUCOSE BLDC GLUCOMTR-MCNC: 205 MG/DL — HIGH (ref 70–99)

## 2020-04-23 DIAGNOSIS — Z71.89 OTHER SPECIFIED COUNSELING: ICD-10-CM
